# Patient Record
(demographics unavailable — no encounter records)

---

## 2024-10-31 NOTE — HISTORY OF PRESENT ILLNESS
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 Mother is 93 with HLD), Uncles, grandfather stomach Ca SH: former smoker good diet - almost vegetarian, still eating some chicken exercise - cardio brisk walk 30 mins, wt bearing 30 mins, walks a lot (min 10k steps a day)

## 2024-10-31 NOTE — REASON FOR VISIT
[Hyperlipidemia] : hyperlipidemia [FreeTextEntry1] :   CV Data: ECG 10/31/24: normal, rsr variant Ca Score 9/17/24: Zero. + mitral and aortic annular calcification  CCTA 7/9/2014: normal coronaries US carotids 12/28/24: no stenosis bilaterally CTA neck 1/3/23: no stenosis bilaterally Cath 5 yrs ago (unable to view report): myocardial bridge

## 2024-11-01 NOTE — REVIEW OF SYSTEMS
[Fatigue] : fatigue [Negative] : Integumentary [FreeTextEntry3] : dry eyes [de-identified] : as above

## 2024-11-01 NOTE — HISTORY OF PRESENT ILLNESS
[de-identified] : 65 yo f with asymptomatic JENY, GERD   - dexa with osteoporosis - mother and sister do not have issues.  She does weight bearing exercises.   Has noticed hiar thinning.  has some scalp itching.   Feels more tired. waking up with migraines.    feels tired upon waking.   able to exercise without issue.   no joint pain, no rashes.   feels full easily.   Getting pelvic floor PT.   note:9/19/24  - s/p evaluation with Dr Barrera - , hx of myocardial bridge and palpitations - checking bp at home-  am 120/80, mid dy 130/80, pm 120/80.   - she has noticed her HR to be irregular at times. She is not taking metoprolol. - does get split seconds moments of disorientation.   - Often wakes up with headaches.  progressively getting worse.  of note MRI brain normal 1/2023 and she is under stress with her mother visiting.    Ibuprofen does help.  rarely takes it.   - she does have x fo cervical fusion 10 years ago, can also cause head pain.  - tingling of feet b/l at night - very minor.- 2-3 times a month.  - very mild.    She does back exercises every day.   -in the past this was much worse- had even considered lumbar surgery - but exercises have significantly helped. - Hair thinning and loss - concerned - she takes B12 once a week.   - EGD and colonoscopy with Dr Rios- due for 3 year colonoscopy next year.   - gyn 2 years ago   all paps have been normal note:3/25/24 Itching eyes - left more than right-  has a discharge,   yellowish in color and watery.  has improved - covid neg.   Note 12/21/23 - reports fatigue- getting worse over the past few months - recently n a conference call - - had a moment where she froze - moment of brain fog - lasted a few seconds.  - Has had this before - maybe 5 times over 10 years,  - having constant headache. every other day. can wake up with it.  can go away after coffee.  - no pain medications.  This has been for the ast 2 years.   - Had vertigo earlier in the year - had seen neurologist - mra of the brain at [facility]. this resolved.   - apppetite is fine. Gets full easily.  s/p EGD with Dr Rios- gastritis- for repeat again in one year. - rx for omeprazole plan for repeat EGD this coming year.   - no change in bowle or bladder.    no new rashes.  no new joint pains  (has chronic knee pain).  - She did have covid in 2021 - had flu covid and rsv vaccines.    almost vegetarian had shingrix  mammo annually

## 2024-11-01 NOTE — PLAN
[FreeTextEntry1] : Fatigue -etiology unclear- with dry eyes send of dorita and sjogrens panel Osteoporosis - age related?  celiac?   labs today- pending results will refer to rheum or endo for further evaluation

## 2024-12-04 NOTE — PHYSICAL EXAM
[Alert] : alert [Well Nourished] : well nourished [No Acute Distress] : no acute distress [Well Developed] : well developed [Normal Sclera/Conjunctiva] : normal sclera/conjunctiva [EOMI] : extra ocular movement intact [No Proptosis] : no proptosis [Thyroid Not Enlarged] : the thyroid was not enlarged [No Thyroid Nodules] : no palpable thyroid nodules [No Respiratory Distress] : no respiratory distress [No Accessory Muscle Use] : no accessory muscle use [Clear to Auscultation] : lungs were clear to auscultation bilaterally [Normal S1, S2] : normal S1 and S2 [Normal Rate] : heart rate was normal [Regular Rhythm] : with a regular rhythm [No Edema] : no peripheral edema [Not Tender] : non-tender [Soft] : abdomen soft [Normal Anterior Cervical Nodes] : no anterior cervical lymphadenopathy [Normal Posterior Cervical Nodes] : no posterior cervical lymphadenopathy [Normal Gait] : normal gait [No Rash] : no rash [Oriented x3] : oriented to person, place, and time [No Stigmata of Cushings Syndrome] : no stigmata of Cushings Syndrome

## 2024-12-04 NOTE — REVIEW OF SYSTEMS
[Fatigue] : no fatigue [Recent Weight Gain (___ Lbs)] : no recent weight gain [Recent Weight Loss (___ Lbs)] : no recent weight loss [Fever] : no fever [Chest Pain] : no chest pain [Shortness Of Breath] : no shortness of breath [Nausea] : no nausea [Constipation] : no constipation [Vomiting] : no vomiting [Diarrhea] : no diarrhea

## 2024-12-04 NOTE — HISTORY OF PRESENT ILLNESS
[FreeTextEntry1] : Ms. RAFY GRAVES is a 66-year-old woman with osteoporosis, GERD/Gastritis, and C3-C5 spinal fusion who presents to the clinic to establish care.  OSTEOPOROSIS She recently had a bone density scan in October 2024, which revealed osteoporosis. This diagnosis was surprising to her, as she says that there is no family history of osteoporosis; her 92-year-old mother and older sister both do not have the condition. She engages in daily exercise, including weight-bearing activities, and considers her diet to be very good. She follows a mostly vegetarian diet but includes dairy products. Since her diagnosis, she has increased her dairy intake to 2-3 servings per day, incorporating yogurt with breakfast and milk into her morning coffee routine. - Initial DXA scan (10/25/2024) showing:      > Lumbar spine T-score of -2.4.      > Femoral neck T-score of -2.9.      > Total hip T-score of -2.4. - Previous fracture Denies. - Current smoking: Denies. Used to smoke 1-2 cigarettes when she was young, has not smoked in more than 20 years. - Glucocorticoid use: Denies. - Rheumatoid arthritis: Denies. - Alcohol (3 or more units/day): Denies. - Parent fractured hip: Denies. - Menopause: Mid-50s. She says that her periods were never regular. - Dental exam: Last year, she underwent a dental implant procedure. The dentist encountered challenges because her bone took longer to heal than anticipated. While it typically takes about 3 months for healing, she had to wait 6 to 9 months for her bone to heal properly. She completed the procedure and follows with her dentist regularly for regular cleaning.  - Last fall: Maybe 10-15 years ago while riding a bike, no recent falls.  - GERD/Gastritis: She undergoes an endoscopy every 2-3 years as part of her routine monitoring and has been following up with a gastroenterologist. She reports that her gastritis is not causing her any trouble at the moment, but if she were to drink coffee in the morning without eating anything, she always gets stomach pain. Therefore, she would not be able to take a pill on an empty stomach which is typically required when taking an oral bisphosphonate.    Most recent labs (8/2024): - Calcium: 9.8 mg/dL - Vitamin D 25-OH: 43.4 ng/mL. - eGFR: 101 mL/min/1.73m2. - TSH 1.84 uIU/mL - Negative screening for celiac disease.

## 2024-12-04 NOTE — ADDENDUM
[FreeTextEntry1] : Time-Based Billing: I have spent 48 minutes on the encounter. This includes time spent with the patient during the visit as well as time spent before and after the visit reviewing the chart, documenting the encounter, reviewing studies, etc.

## 2024-12-04 NOTE — ASSESSMENT
[FreeTextEntry1] : 1. Postmenopausal osteoporosis - The patient was found to have osteoporosis with the lowest T-score at the femoral neck (-2.9).  - Screening labs to evaluate for secondary causes of osteoporosis, including celiac disease, thyroid dysfunction, vitamin D deficiency, and hypercalcemia, have all been normal. - Additional labs to screen for other rare conditions that can precipitate bone loss, such as multiple myeloma, hyperparathyroidism, will be checked today. If these labs are normal, the patient most likely has postmenopausal osteoporosis in the absence of secondary causes for bone loss.  - I discussed with her that the first line of treatment for postmenopausal osteoporosis is bisphosphonates. Given the patient's history of acid reflux and gastritis, the IV formulation (Reclast) would be preferrable to avoid potential side effects such as esophagitis and worsening gastritis. I explained the risks and benefits of starting this medication, including hypocalcemia, esophagitis, osteonecrosis of the jaw, and atypical femur fracture. - We also discussed lifestyle modifications including: -- Ensure adequate calcium intake of 9163-7815 mg daily through diet and supplements.  -- Ensure vitamin D intake of at least 600-800 IU daily.  -- Continue strength training exercises -- Fall prevention strategies -- Avoid smoking and excessive alcohol intake (not an issue for her at present) - If labs are normal, the patient will proceed with IV bisphosphonate treatment. I will follow up once results are available to discuss via phone.  - Repeat bone density in 2 years.   RTC in 1 year.

## 2024-12-09 NOTE — ASSESSMENT
[Review of test: [ enter lab tests ] :____] : I reviewed the following test results: [unfilled] [FreeTextEntry1] : #Hair loss, with component of #AGA, potentially unmasked by telogen from patient history. No clear trigger -chronic, progressive, not at treatment goal  -labs above reviewed -disc treatment options, including topical and oral minoxidil, oral finasteride, LLLT, OTC supplements  -offered scalp bx though do not think it is necessary at this point, would see progression and assess response to treatment, pt in agreement  -opting to c/w topical minoxidil for now -c/w minoxidil 5% foam daily. Discussed proper application to affected areas of scalp, avoidance of face due to risk of hypertrichosis, need for 6-9 months of consistent use to see effect, possible shedding with initial use and that results are dependent on continued use of product.  RTC 6 months

## 2024-12-09 NOTE — HISTORY OF PRESENT ILLNESS
[FreeTextEntry1] : NP hair loss [de-identified] : NP Hair loss Noticing a lot of shedding and thereafter decreased density over scalp Scalp minimally itchy over frontal area, but only when she's touching it Ongoing for several month but really accelerated over the last 2-3 months Started Rogaine last month which has slowed down shedding a little

## 2024-12-09 NOTE — PHYSICAL EXAM
[FreeTextEntry3] : No scalp erythema or scaling  Decreased density fairly diffusely over scalp, follicular ostia intact, miniaturization on dermoscopy Neg hair pull

## 2025-02-27 NOTE — HISTORY OF PRESENT ILLNESS
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 No new meds  2/5/25 lipids:  by cleaning diet  2/27/25 FU: optimized lifestyle. prefers not to take meds. JOYNER and CP at peak exercise unchanged (led to getting cath 5 yrs ago).  FH: no early CAD, (father lived to 92, no disease. Mother is 93 with HLD), Uncles, grandfather stomach Ca SH: former smoker good diet - almost vegetarian, still eating some chicken exercise - cardio brisk walk 30 mins, wt bearing 30 mins, walks a lot (min 10k steps a day)

## 2025-02-27 NOTE — REASON FOR VISIT
[Hyperlipidemia] : hyperlipidemia [Hypertension] : hypertension [FreeTextEntry1] :   CV Data: ECG 10/31/24: normal, rsr variant Ca Score 9/17/24: Zero. + mitral and aortic annular calcification  CCTA 7/9/2014: normal coronaries US carotids 12/28/24: no stenosis bilaterally CTA neck 1/3/23: no stenosis bilaterally Cath 5 yrs ago (unable to view report): myocardial bridge

## 2025-05-10 NOTE — REASON FOR VISIT
[Follow-Up] : a follow-up visit [TextBox_44] : follow up for review of ct scan patient with a history of kedar

## 2025-05-10 NOTE — DISCUSSION/SUMMARY
[FreeTextEntry1] : i would prefer to get a yearly ct scan but she defers will get a chest film next year then' i explained the pros and cons to this approach and what ky would prompt her to see me before then

## 2025-05-10 NOTE — PROCEDURE
[FreeTextEntry1] : ct reviewed with patient went over the old ct comp;ared it to the new one went slice by slice in front of her

## 2025-05-10 NOTE — HISTORY OF PRESENT ILLNESS
[TextBox_4] : patient with a history of kedar i had seen her in the past, lost to follow up now returned six months ago fu ct scan shows that the largest nodule in the right lung is smaller all others are stable or better she feels well this has always been asymptomati